# Patient Record
Sex: MALE | Race: WHITE | Employment: UNEMPLOYED | URBAN - METROPOLITAN AREA
[De-identification: names, ages, dates, MRNs, and addresses within clinical notes are randomized per-mention and may not be internally consistent; named-entity substitution may affect disease eponyms.]

---

## 2022-06-04 ENCOUNTER — TELEPHONE ENCOUNTER (OUTPATIENT)
Dept: URBAN - METROPOLITAN AREA CLINIC 68 | Facility: CLINIC | Age: 37
End: 2022-06-04

## 2022-06-05 ENCOUNTER — TELEPHONE ENCOUNTER (OUTPATIENT)
Dept: URBAN - METROPOLITAN AREA CLINIC 68 | Facility: CLINIC | Age: 37
End: 2022-06-05

## 2022-06-05 RX ORDER — PANTOPRAZOLE SODIUM 40 MG/1
TABLET, DELAYED RELEASE ORAL EVERY 12 HOURS
Qty: 120 | Refills: 0 | Status: ACTIVE | COMMUNITY
Start: 2019-09-18

## 2022-06-25 ENCOUNTER — TELEPHONE ENCOUNTER (OUTPATIENT)
Age: 37
End: 2022-06-25

## 2022-06-26 ENCOUNTER — TELEPHONE ENCOUNTER (OUTPATIENT)
Age: 37
End: 2022-06-26

## 2022-06-26 RX ORDER — PANTOPRAZOLE 40 MG/1
TABLET, DELAYED RELEASE ORAL EVERY 12 HOURS
Qty: 120 | Refills: 0 | Status: ACTIVE | COMMUNITY
Start: 2019-09-18

## 2023-10-17 ENCOUNTER — TELEPHONE ENCOUNTER (OUTPATIENT)
Dept: URBAN - METROPOLITAN AREA CLINIC 68 | Facility: CLINIC | Age: 38
End: 2023-10-17

## 2023-10-18 ENCOUNTER — OFFICE VISIT (OUTPATIENT)
Dept: URBAN - METROPOLITAN AREA CLINIC 68 | Facility: CLINIC | Age: 38
End: 2023-10-18
Payer: OTHER GOVERNMENT

## 2023-10-18 ENCOUNTER — LAB OUTSIDE AN ENCOUNTER (OUTPATIENT)
Dept: URBAN - METROPOLITAN AREA CLINIC 68 | Facility: CLINIC | Age: 38
End: 2023-10-18

## 2023-10-18 VITALS — BODY MASS INDEX: 23.38 KG/M2 | WEIGHT: 188 LBS | HEIGHT: 75 IN

## 2023-10-18 DIAGNOSIS — R13.10 DYSPHAGIA, UNSPECIFIED TYPE: ICD-10-CM

## 2023-10-18 DIAGNOSIS — K22.2 ESOPHAGEAL STRICTURE: ICD-10-CM

## 2023-10-18 DIAGNOSIS — R19.7 DIARRHEA, UNSPECIFIED TYPE: ICD-10-CM

## 2023-10-18 DIAGNOSIS — K20.0 EOSINOPHILIC ESOPHAGITIS: ICD-10-CM

## 2023-10-18 PROCEDURE — 99204 OFFICE O/P NEW MOD 45 MIN: CPT | Performed by: INTERNAL MEDICINE

## 2023-10-18 RX ORDER — PANTOPRAZOLE 40 MG/1
TABLET, DELAYED RELEASE ORAL EVERY 12 HOURS
Qty: 120 | Refills: 0 | Status: ON HOLD | COMMUNITY
Start: 2019-09-18

## 2023-10-18 NOTE — HPI-TODAY'S VISIT:
Patient evaluated due to dysphagia and Eosinophilic esophagitis.  Referred that is currently  worsening dysphagia. Referred last EGD with dilation aproximatelly 1-2 yrs.  Referred having intermittent episodes of diarrhea for years.  Denies nausea, vomits, odynophagia, heartburn, abdominal pain, constipation GI bleeding or weight loss

## 2023-11-06 ENCOUNTER — OUT OF OFFICE VISIT (OUTPATIENT)
Dept: URBAN - METROPOLITAN AREA SURGERY CENTER 12 | Facility: SURGERY CENTER | Age: 38
End: 2023-11-06
Payer: OTHER GOVERNMENT

## 2023-11-06 ENCOUNTER — CLAIMS CREATED FROM THE CLAIM WINDOW (OUTPATIENT)
Dept: URBAN - METROPOLITAN AREA CLINIC 4 | Facility: CLINIC | Age: 38
End: 2023-11-06
Payer: OTHER GOVERNMENT

## 2023-11-06 DIAGNOSIS — K29.70 GASTRITIS: ICD-10-CM

## 2023-11-06 DIAGNOSIS — K29.70 GASTRITIS WITHOUT BLEEDING, UNSPECIFIED CHRONICITY, UNSPECIFIED GASTRITIS TYPE: ICD-10-CM

## 2023-11-06 DIAGNOSIS — K29.70 GASTRITIS, UNSPECIFIED, WITHOUT BLEEDING: ICD-10-CM

## 2023-11-06 DIAGNOSIS — K22.2 ESOPHAGEAL STENOSIS: ICD-10-CM

## 2023-11-06 DIAGNOSIS — K22.2 ESOPHAGEAL OBSTRUCTION: ICD-10-CM

## 2023-11-06 DIAGNOSIS — R13.10 DYSPHAGIA: ICD-10-CM

## 2023-11-06 DIAGNOSIS — K20.90 ESOPHAGITIS, UNSPECIFIED WITHOUT BLEEDING: ICD-10-CM

## 2023-11-06 PROCEDURE — 43239 EGD BIOPSY SINGLE/MULTIPLE: CPT | Performed by: INTERNAL MEDICINE

## 2023-11-06 PROCEDURE — 43248 EGD GUIDE WIRE INSERTION: CPT | Performed by: INTERNAL MEDICINE

## 2023-11-06 PROCEDURE — 88312 SPECIAL STAINS GROUP 1: CPT | Performed by: PATHOLOGY

## 2023-11-06 PROCEDURE — 88305 TISSUE EXAM BY PATHOLOGIST: CPT | Performed by: PATHOLOGY

## 2023-11-06 PROCEDURE — 00731 ANES UPR GI NDSC PX NOS: CPT | Performed by: NURSE ANESTHETIST, CERTIFIED REGISTERED

## 2023-11-06 RX ORDER — PANTOPRAZOLE 40 MG/1
TABLET, DELAYED RELEASE ORAL EVERY 12 HOURS
Qty: 120 | Refills: 0 | Status: ON HOLD | COMMUNITY
Start: 2019-09-18

## 2023-11-15 ENCOUNTER — OFFICE VISIT (OUTPATIENT)
Dept: URBAN - METROPOLITAN AREA CLINIC 67 | Facility: CLINIC | Age: 38
End: 2023-11-15

## 2023-11-22 ENCOUNTER — OFFICE VISIT (OUTPATIENT)
Dept: URBAN - METROPOLITAN AREA CLINIC 68 | Facility: CLINIC | Age: 38
End: 2023-11-22
Payer: OTHER GOVERNMENT

## 2023-11-22 VITALS
SYSTOLIC BLOOD PRESSURE: 120 MMHG | HEIGHT: 75 IN | DIASTOLIC BLOOD PRESSURE: 82 MMHG | RESPIRATION RATE: 90 BRPM | BODY MASS INDEX: 23.38 KG/M2 | WEIGHT: 188 LBS | OXYGEN SATURATION: 99 %

## 2023-11-22 DIAGNOSIS — R13.10 DYSPHAGIA, UNSPECIFIED TYPE: ICD-10-CM

## 2023-11-22 DIAGNOSIS — R19.7 DIARRHEA, UNSPECIFIED TYPE: ICD-10-CM

## 2023-11-22 DIAGNOSIS — K20.0 EOSINOPHILIC ESOPHAGITIS: ICD-10-CM

## 2023-11-22 PROCEDURE — 99214 OFFICE O/P EST MOD 30 MIN: CPT

## 2023-11-22 RX ORDER — PANTOPRAZOLE 40 MG/1
TABLET, DELAYED RELEASE ORAL EVERY 12 HOURS
Qty: 120 | Refills: 0 | Status: ON HOLD | COMMUNITY
Start: 2019-09-18

## 2023-11-22 NOTE — HPI-TODAY'S VISIT:
Patient presents for follow-up s/p EGD due to dysphagia and Eosinophilic esophagitis.  Referred dysphagia has improved after dilitation. Referred having intermittent episodes of diarrhea for years. He does have upcoming HBT to r/o SIBO. Denies nausea, vomits, odynophagia, heartburn, abdominal pain, constipation GI bleeding or weight loss.

## 2023-12-21 ENCOUNTER — OFFICE VISIT (OUTPATIENT)
Dept: URBAN - METROPOLITAN AREA CLINIC 67 | Facility: CLINIC | Age: 38
End: 2023-12-21
Payer: OTHER GOVERNMENT

## 2023-12-21 DIAGNOSIS — K63.8219 SMALL INTESTINAL BACTERIAL OVERGROWTH (SIBO): ICD-10-CM

## 2023-12-21 PROCEDURE — 91065 BREATH HYDROGEN/METHANE TEST: CPT | Performed by: INTERNAL MEDICINE

## 2024-01-08 ENCOUNTER — LAB OUTSIDE AN ENCOUNTER (OUTPATIENT)
Dept: URBAN - METROPOLITAN AREA CLINIC 68 | Facility: CLINIC | Age: 39
End: 2024-01-08

## 2024-01-08 ENCOUNTER — OFFICE VISIT (OUTPATIENT)
Dept: URBAN - METROPOLITAN AREA CLINIC 68 | Facility: CLINIC | Age: 39
End: 2024-01-08
Payer: OTHER GOVERNMENT

## 2024-01-08 VITALS — HEIGHT: 75 IN | OXYGEN SATURATION: 99 % | BODY MASS INDEX: 23.38 KG/M2 | WEIGHT: 188 LBS | HEART RATE: 77 BPM

## 2024-01-08 DIAGNOSIS — R12 HEARTBURN: ICD-10-CM

## 2024-01-08 DIAGNOSIS — R13.10 DYSPHAGIA, UNSPECIFIED TYPE: ICD-10-CM

## 2024-01-08 DIAGNOSIS — K63.8219 SMALL INTESTINAL BACTERIAL OVERGROWTH (SIBO): ICD-10-CM

## 2024-01-08 DIAGNOSIS — K58.0 IRRITABLE BOWEL SYNDROME WITH DIARRHEA: ICD-10-CM

## 2024-01-08 PROBLEM — 197125005: Status: ACTIVE | Noted: 2024-01-04

## 2024-01-08 PROCEDURE — 99214 OFFICE O/P EST MOD 30 MIN: CPT

## 2024-01-08 RX ORDER — FAMOTIDINE 40 MG/1
1 TABLET AT BEDTIME TABLET, FILM COATED ORAL ONCE A DAY
Qty: 90 TABLET | Refills: 0 | OUTPATIENT
Start: 2024-01-08

## 2024-01-08 RX ORDER — PANTOPRAZOLE 40 MG/1
TABLET, DELAYED RELEASE ORAL EVERY 12 HOURS
Qty: 120 | Refills: 0 | Status: ON HOLD | COMMUNITY
Start: 2019-09-18

## 2024-01-08 RX ORDER — PANTOPRAZOLE SODIUM 40 MG/1
1 TABLET TABLET, DELAYED RELEASE ORAL ONCE A DAY
Qty: 90 TABLET | Refills: 3 | OUTPATIENT
Start: 2024-01-08

## 2024-01-08 RX ORDER — RIFAXIMIN 550 MG/1
1 TABLET TABLET ORAL THREE TIMES A DAY
Qty: 42 TABLET | Refills: 0 | OUTPATIENT
Start: 2024-01-04 | End: 2024-01-18

## 2024-01-08 NOTE — HPI-TODAY'S VISIT:
Patient presents for HBT results consistent with SIBO. Refers continues with soft stools daily. Refers persistent heartburn daily. Uses Tums without relief.  Refers has not started PPI. Refers difficulty swallowing pills and some solids which has progressively worsened since his last EGD/dilation (11/6/23). Denies nausea, vomiting, regurgitation, odynophagia, abdominal pain, constipation GI bleeding or weight loss.

## 2024-01-08 NOTE — PHYSICAL EXAM HENT:
Head, normocephalic, atraumatic, Face, Face within normal limits, Ears, External ears within normal limits, Nose/Nasopharynx, External nose normal appearance, nares patent, no nasal discharge,  Lips, Appearance normal

## 2024-01-11 ENCOUNTER — CLAIMS CREATED FROM THE CLAIM WINDOW (OUTPATIENT)
Dept: URBAN - METROPOLITAN AREA SURGERY CENTER 12 | Facility: SURGERY CENTER | Age: 39
End: 2024-01-11
Payer: OTHER GOVERNMENT

## 2024-01-11 DIAGNOSIS — K29.70 GASTRITIS WITHOUT BLEEDING: ICD-10-CM

## 2024-01-11 DIAGNOSIS — K20.90 ESOPHAGITIS: ICD-10-CM

## 2024-01-11 DIAGNOSIS — K22.2 ESOPHAGEAL OBSTRUCTION: ICD-10-CM

## 2024-01-11 DIAGNOSIS — K22.2 OBSTRUCTION OF ESOPHAGUS: ICD-10-CM

## 2024-01-11 PROCEDURE — 43248 EGD GUIDE WIRE INSERTION: CPT | Performed by: INTERNAL MEDICINE

## 2024-01-11 PROCEDURE — 00731 ANES UPR GI NDSC PX NOS: CPT | Performed by: NURSE ANESTHETIST, CERTIFIED REGISTERED

## 2024-01-11 RX ORDER — FAMOTIDINE 40 MG/1
1 TABLET AT BEDTIME TABLET, FILM COATED ORAL ONCE A DAY
Qty: 90 TABLET | Refills: 0 | Status: ACTIVE | COMMUNITY
Start: 2024-01-08

## 2024-01-11 RX ORDER — PANTOPRAZOLE SODIUM 40 MG/1
1 TABLET TABLET, DELAYED RELEASE ORAL ONCE A DAY
Qty: 90 TABLET | Refills: 3 | Status: ACTIVE | COMMUNITY
Start: 2024-01-08

## 2024-01-11 RX ORDER — PANTOPRAZOLE 40 MG/1
TABLET, DELAYED RELEASE ORAL EVERY 12 HOURS
Qty: 120 | Refills: 0 | Status: ON HOLD | COMMUNITY
Start: 2019-09-18

## 2024-01-11 RX ORDER — RIFAXIMIN 550 MG/1
1 TABLET TABLET ORAL THREE TIMES A DAY
Qty: 42 TABLET | Refills: 0 | Status: ACTIVE | COMMUNITY
Start: 2024-01-04 | End: 2024-01-18

## 2024-01-25 ENCOUNTER — OFFICE VISIT (OUTPATIENT)
Dept: URBAN - METROPOLITAN AREA CLINIC 68 | Facility: CLINIC | Age: 39
End: 2024-01-25

## 2024-01-25 RX ORDER — PANTOPRAZOLE 40 MG/1
TABLET, DELAYED RELEASE ORAL EVERY 12 HOURS
Qty: 120 | Refills: 0 | Status: ON HOLD | COMMUNITY
Start: 2019-09-18

## 2024-01-25 RX ORDER — PANTOPRAZOLE SODIUM 40 MG/1
1 TABLET TABLET, DELAYED RELEASE ORAL ONCE A DAY
Qty: 90 TABLET | Refills: 3 | Status: ACTIVE | COMMUNITY
Start: 2024-01-08

## 2024-01-25 RX ORDER — FAMOTIDINE 40 MG/1
1 TABLET AT BEDTIME TABLET, FILM COATED ORAL ONCE A DAY
Qty: 90 TABLET | Refills: 0 | Status: ACTIVE | COMMUNITY
Start: 2024-01-08

## 2024-01-25 NOTE — HPI-TODAY'S VISIT:
Patient evaluated due to dysphagia/EoE. Had recent EGD with dilation with savary bar 15mm.  Today referred that Denies nausea, vomits,odynophagia, heartburn, abdominal pain, diarrhea, constipation GI bleeding or weight loss

## 2024-01-29 ENCOUNTER — OFFICE VISIT (OUTPATIENT)
Dept: URBAN - METROPOLITAN AREA CLINIC 68 | Facility: CLINIC | Age: 39
End: 2024-01-29

## 2024-01-29 RX ORDER — PANTOPRAZOLE SODIUM 40 MG/1
1 TABLET TABLET, DELAYED RELEASE ORAL ONCE A DAY
Qty: 90 TABLET | Refills: 3 | Status: ACTIVE | COMMUNITY
Start: 2024-01-08

## 2024-01-29 RX ORDER — PANTOPRAZOLE 40 MG/1
TABLET, DELAYED RELEASE ORAL EVERY 12 HOURS
Qty: 120 | Refills: 0 | Status: ON HOLD | COMMUNITY
Start: 2019-09-18

## 2024-01-29 RX ORDER — FAMOTIDINE 40 MG/1
1 TABLET AT BEDTIME TABLET, FILM COATED ORAL ONCE A DAY
Qty: 90 TABLET | Refills: 0 | Status: ACTIVE | COMMUNITY
Start: 2024-01-08

## 2024-01-29 NOTE — PHYSICAL EXAM NEUROLOGIC:
oriented to person, place and time , normal sensation , short and long term memory intact conducted a detailed discussion... I had a detailed discussion with the patient and/or guardian regarding the historical points, exam findings, and any diagnostic results supporting the discharge/admit diagnosis.

## 2024-01-29 NOTE — HPI-TODAY'S VISIT:
Patient evaluated due to dysphagia/EoE. Had recent EGD with savary 15mm dilation.  TOday referred that.  Denies nausea, vomits,  odynophagia, heartburn, abdominal pain, diarrhea, constipation GI bleeding or weight loss

## 2024-02-07 ENCOUNTER — OV EP (OUTPATIENT)
Dept: URBAN - METROPOLITAN AREA CLINIC 68 | Facility: CLINIC | Age: 39
End: 2024-02-07
Payer: OTHER GOVERNMENT

## 2024-02-07 ENCOUNTER — LAB (OUTPATIENT)
Dept: URBAN - METROPOLITAN AREA CLINIC 68 | Facility: CLINIC | Age: 39
End: 2024-02-07

## 2024-02-07 VITALS
WEIGHT: 188 LBS | SYSTOLIC BLOOD PRESSURE: 122 MMHG | DIASTOLIC BLOOD PRESSURE: 80 MMHG | BODY MASS INDEX: 23.38 KG/M2 | HEIGHT: 75 IN

## 2024-02-07 DIAGNOSIS — R13.10 DYSPHAGIA, UNSPECIFIED TYPE: ICD-10-CM

## 2024-02-07 DIAGNOSIS — R12 HEARTBURN: ICD-10-CM

## 2024-02-07 DIAGNOSIS — K20.0 EOSINOPHILIC ESOPHAGITIS: ICD-10-CM

## 2024-02-07 DIAGNOSIS — R19.7 DIARRHEA, UNSPECIFIED TYPE: ICD-10-CM

## 2024-02-07 PROBLEM — 62315008: Status: ACTIVE | Noted: 2023-10-18

## 2024-02-07 PROBLEM — 16331000: Status: ACTIVE | Noted: 2024-01-08

## 2024-02-07 PROCEDURE — 99214 OFFICE O/P EST MOD 30 MIN: CPT

## 2024-02-07 RX ORDER — SODIUM PICOSULFATE, MAGNESIUM OXIDE, AND ANHYDROUS CITRIC ACID 12; 3.5; 1 G/175ML; G/175ML; MG/175ML
175 ML THE FIRST DOSE THE EVENING BEFORE AND SECOND DOSE THE MORNING OF COLONOSCOPY LIQUID ORAL ONCE A DAY
Qty: 350 ML | Refills: 0 | OUTPATIENT
Start: 2024-02-07 | End: 2024-02-09

## 2024-02-07 RX ORDER — FAMOTIDINE 40 MG/1
1 TABLET AT BEDTIME TABLET, FILM COATED ORAL ONCE A DAY
Qty: 90 TABLET | Refills: 0 | Status: ACTIVE | COMMUNITY
Start: 2024-01-08

## 2024-02-07 RX ORDER — PANTOPRAZOLE SODIUM 40 MG/1
1 TABLET TABLET, DELAYED RELEASE ORAL ONCE A DAY
Qty: 90 TABLET | Refills: 3 | Status: ACTIVE | COMMUNITY
Start: 2024-01-08

## 2024-02-07 RX ORDER — PANTOPRAZOLE 40 MG/1
TABLET, DELAYED RELEASE ORAL EVERY 12 HOURS
Qty: 120 | Refills: 0 | Status: ON HOLD | COMMUNITY
Start: 2019-09-18

## 2024-02-07 NOTE — HPI-TODAY'S VISIT:
Patient presents for follow-up s/p EGD. Refers moderte relief of symptoms however continues with some difficulties swallowing. Refers he feels as though there is a "shelf" inside the upper region of his throat. Refers continues with daily diarrhea 3x per day which started several years ago. Denies nocturnal bm's. He did have HBT consistent with SIBO and completed Xifaxan tx with no relief. Refers continues on Pantoprazole 40mg/d with relief of heartburn. Patient denies nausea/vomiting, abdominal pain, melena, rectal bleeding, constipation, weight loss, fever.

## 2024-02-17 LAB
CALPROTECTIN, FECAL: 41
CLOSTRIDIUM DIFFICILE: (no result)
LACTOFERRIN, FECAL, QUANT.: <6.25
OVA AND PARASITES, CONC AND PERM SMEAR: (no result)
PANCREATIC ELASTASE, FECAL: >500

## 2024-03-07 ENCOUNTER — LAB (OUTPATIENT)
Dept: URBAN - METROPOLITAN AREA CLINIC 4 | Facility: CLINIC | Age: 39
End: 2024-03-07
Payer: OTHER GOVERNMENT

## 2024-03-07 ENCOUNTER — COLON (OUTPATIENT)
Dept: URBAN - METROPOLITAN AREA SURGERY CENTER 12 | Facility: SURGERY CENTER | Age: 39
End: 2024-03-07
Payer: OTHER GOVERNMENT

## 2024-03-07 DIAGNOSIS — R19.7 DIARRHEA, UNSPECIFIED TYPE: ICD-10-CM

## 2024-03-07 DIAGNOSIS — K64.8 OTHER HEMORRHOIDS: ICD-10-CM

## 2024-03-07 DIAGNOSIS — K63.89 OTHER SPECIFIED DISEASES OF INTESTINE: ICD-10-CM

## 2024-03-07 PROCEDURE — 88342 IMHCHEM/IMCYTCHM 1ST ANTB: CPT | Performed by: PATHOLOGY

## 2024-03-07 PROCEDURE — 88313 SPECIAL STAINS GROUP 2: CPT | Performed by: PATHOLOGY

## 2024-03-07 PROCEDURE — 45380 COLONOSCOPY AND BIOPSY: CPT | Performed by: INTERNAL MEDICINE

## 2024-03-07 PROCEDURE — 88305 TISSUE EXAM BY PATHOLOGIST: CPT | Performed by: PATHOLOGY

## 2024-03-07 RX ORDER — FAMOTIDINE 40 MG/1
1 TABLET AT BEDTIME TABLET, FILM COATED ORAL ONCE A DAY
Qty: 90 TABLET | Refills: 0 | Status: ACTIVE | COMMUNITY
Start: 2024-01-08

## 2024-03-07 RX ORDER — PANTOPRAZOLE SODIUM 40 MG/1
1 TABLET TABLET, DELAYED RELEASE ORAL ONCE A DAY
Qty: 90 TABLET | Refills: 3 | Status: ACTIVE | COMMUNITY
Start: 2024-01-08

## 2024-03-07 RX ORDER — PANTOPRAZOLE 40 MG/1
TABLET, DELAYED RELEASE ORAL EVERY 12 HOURS
Qty: 120 | Refills: 0 | Status: ON HOLD | COMMUNITY
Start: 2019-09-18

## 2024-03-21 ENCOUNTER — OV EP (OUTPATIENT)
Dept: URBAN - METROPOLITAN AREA CLINIC 68 | Facility: CLINIC | Age: 39
End: 2024-03-21

## 2024-03-21 VITALS
TEMPERATURE: 97.7 F | BODY MASS INDEX: 23 KG/M2 | OXYGEN SATURATION: 99 % | DIASTOLIC BLOOD PRESSURE: 78 MMHG | SYSTOLIC BLOOD PRESSURE: 122 MMHG | HEART RATE: 78 BPM | HEIGHT: 75 IN | WEIGHT: 185 LBS

## 2024-03-21 DIAGNOSIS — K63.8219 SMALL INTESTINAL BACTERIAL OVERGROWTH (SIBO): ICD-10-CM

## 2024-03-21 DIAGNOSIS — K20.0 EOSINOPHILIC ESOPHAGITIS: ICD-10-CM

## 2024-03-21 DIAGNOSIS — R19.7 DIARRHEA, UNSPECIFIED TYPE: ICD-10-CM

## 2024-03-21 DIAGNOSIS — R13.10 DYSPHAGIA, UNSPECIFIED TYPE: ICD-10-CM

## 2024-03-21 DIAGNOSIS — R12 HEARTBURN: ICD-10-CM

## 2024-03-21 RX ORDER — PANTOPRAZOLE SODIUM 40 MG/1
1 TABLET TABLET, DELAYED RELEASE ORAL ONCE A DAY
Qty: 90 TABLET | Refills: 3 | Status: ACTIVE | COMMUNITY
Start: 2024-01-08

## 2024-03-21 RX ORDER — FAMOTIDINE 40 MG/1
1 TABLET AT BEDTIME TABLET, FILM COATED ORAL ONCE A DAY
Qty: 90 TABLET | Refills: 0 | Status: ACTIVE | COMMUNITY
Start: 2024-01-08

## 2024-03-21 RX ORDER — BUDESONIDE 2 MG/10ML
10 ML SUSPENSION ORAL TWICE A DAY
Qty: 600 ML | Refills: 2 | OUTPATIENT
Start: 2024-03-21 | End: 2024-06-19

## 2024-03-21 NOTE — HPI-TODAY'S VISIT:
Patient evaluated due to EoE and chronic diarrhea. Refers continues on Pantoprazole 40mg/d with relief of heartburn however continues with difficulties swallowing with globus sensation. Refers continues with daily diarrhea 3x per day which started several years ago. He did have HBT consistent with SIBO and completed Xifaxan tx with no relief. Patient denies nausea/vomiting, abdominal pain, melena, rectal bleeding, constipation, weight loss, fever.

## 2024-05-03 ENCOUNTER — TELEPHONE ENCOUNTER (OUTPATIENT)
Dept: URBAN - METROPOLITAN AREA CLINIC 68 | Facility: CLINIC | Age: 39
End: 2024-05-03

## 2024-05-10 ENCOUNTER — OFFICE VISIT (OUTPATIENT)
Dept: URBAN - METROPOLITAN AREA CLINIC 68 | Facility: CLINIC | Age: 39
End: 2024-05-10
Payer: OTHER GOVERNMENT

## 2024-05-10 VITALS
SYSTOLIC BLOOD PRESSURE: 132 MMHG | WEIGHT: 180 LBS | BODY MASS INDEX: 22.38 KG/M2 | HEIGHT: 75 IN | DIASTOLIC BLOOD PRESSURE: 78 MMHG

## 2024-05-10 DIAGNOSIS — K63.8219 SMALL INTESTINAL BACTERIAL OVERGROWTH (SIBO): ICD-10-CM

## 2024-05-10 DIAGNOSIS — K20.0 EOSINOPHILIC ESOPHAGITIS: ICD-10-CM

## 2024-05-10 DIAGNOSIS — R12 HEARTBURN: ICD-10-CM

## 2024-05-10 PROCEDURE — 99213 OFFICE O/P EST LOW 20 MIN: CPT | Performed by: INTERNAL MEDICINE

## 2024-05-10 RX ORDER — PANTOPRAZOLE SODIUM 40 MG/1
1 TABLET TABLET, DELAYED RELEASE ORAL ONCE A DAY
Qty: 90 TABLET | Refills: 3 | Status: ACTIVE | COMMUNITY
Start: 2024-01-08

## 2024-05-10 RX ORDER — FAMOTIDINE 40 MG/1
1 TABLET AT BEDTIME TABLET, FILM COATED ORAL ONCE A DAY
Qty: 90 TABLET | Refills: 0 | Status: ACTIVE | COMMUNITY
Start: 2024-01-08

## 2024-05-10 RX ORDER — BUDESONIDE 2 MG/10ML
10 ML SUSPENSION ORAL TWICE A DAY
Qty: 600 ML | Refills: 2 | Status: ACTIVE | COMMUNITY
Start: 2024-03-21 | End: 2024-06-19

## 2024-05-29 ENCOUNTER — OFFICE VISIT (OUTPATIENT)
Dept: URBAN - METROPOLITAN AREA CLINIC 68 | Facility: CLINIC | Age: 39
End: 2024-05-29

## 2024-05-29 ENCOUNTER — DASHBOARD ENCOUNTERS (OUTPATIENT)
Age: 39
End: 2024-05-29

## 2024-05-29 RX ORDER — PANTOPRAZOLE SODIUM 40 MG/1
1 TABLET TABLET, DELAYED RELEASE ORAL ONCE A DAY
Qty: 90 TABLET | Refills: 3 | Status: ACTIVE | COMMUNITY
Start: 2024-01-08

## 2024-05-29 RX ORDER — BUDESONIDE 2 MG/10ML
10 ML SUSPENSION ORAL TWICE A DAY
Qty: 600 ML | Refills: 2 | Status: ACTIVE | COMMUNITY
Start: 2024-03-21 | End: 2024-06-19

## 2024-05-29 RX ORDER — FAMOTIDINE 40 MG/1
1 TABLET AT BEDTIME TABLET, FILM COATED ORAL ONCE A DAY
Qty: 90 TABLET | Refills: 0 | Status: ACTIVE | COMMUNITY
Start: 2024-01-08

## 2024-06-13 ENCOUNTER — OFFICE VISIT (OUTPATIENT)
Dept: URBAN - METROPOLITAN AREA CLINIC 68 | Facility: CLINIC | Age: 39
End: 2024-06-13
Payer: OTHER GOVERNMENT

## 2024-06-13 ENCOUNTER — LAB OUTSIDE AN ENCOUNTER (OUTPATIENT)
Dept: URBAN - METROPOLITAN AREA CLINIC 68 | Facility: CLINIC | Age: 39
End: 2024-06-13

## 2024-06-13 DIAGNOSIS — R12 HEARTBURN: ICD-10-CM

## 2024-06-13 DIAGNOSIS — K20.0 EOSINOPHILIC ESOPHAGITIS: ICD-10-CM

## 2024-06-13 PROCEDURE — 99214 OFFICE O/P EST MOD 30 MIN: CPT | Performed by: INTERNAL MEDICINE

## 2024-06-13 RX ORDER — PANTOPRAZOLE SODIUM 40 MG/1
1 TABLET TABLET, DELAYED RELEASE ORAL ONCE A DAY
Qty: 90 TABLET | Refills: 3 | Status: ACTIVE | COMMUNITY
Start: 2024-01-08

## 2024-06-13 RX ORDER — BUDESONIDE 2 MG/10ML
10 ML SUSPENSION ORAL TWICE A DAY
Qty: 600 ML | Refills: 2 | Status: ACTIVE | COMMUNITY
Start: 2024-03-21 | End: 2024-06-19

## 2024-06-13 RX ORDER — FAMOTIDINE 40 MG/1
1 TABLET AT BEDTIME TABLET, FILM COATED ORAL ONCE A DAY
Qty: 90 TABLET | Refills: 0 | Status: ACTIVE | COMMUNITY
Start: 2024-01-08

## 2024-06-13 NOTE — HPI-TODAY'S VISIT:
Patient evaluated due to EoE. On his last evaluation Eohilia samples were provided.  Referred only used the Eohilia for 2 days. Referrd having persistent dysphagia with solids.  Today referred that had previous allergist evaluations.  Denies nausea, vomits, odynophagia, heartburn, abdominal pain, diarrhea, constipation GI bleeding or weight loss

## 2024-06-20 ENCOUNTER — CLAIMS CREATED FROM THE CLAIM WINDOW (OUTPATIENT)
Dept: URBAN - METROPOLITAN AREA SURGERY CENTER 12 | Facility: SURGERY CENTER | Age: 39
End: 2024-06-20
Payer: OTHER GOVERNMENT

## 2024-06-20 DIAGNOSIS — K22.2 ESOPHAGEAL OBSTRUCTION: ICD-10-CM

## 2024-06-20 DIAGNOSIS — R13.19 DYSPHAGIA: ICD-10-CM

## 2024-06-20 PROCEDURE — 43248 EGD GUIDE WIRE INSERTION: CPT | Performed by: INTERNAL MEDICINE

## 2024-06-20 PROCEDURE — 00731 ANES UPR GI NDSC PX NOS: CPT | Performed by: NURSE ANESTHETIST, CERTIFIED REGISTERED

## 2024-06-20 RX ORDER — PANTOPRAZOLE SODIUM 40 MG/1
1 TABLET TABLET, DELAYED RELEASE ORAL ONCE A DAY
Qty: 90 TABLET | Refills: 3 | Status: ACTIVE | COMMUNITY
Start: 2024-01-08

## 2024-06-20 RX ORDER — FAMOTIDINE 40 MG/1
1 TABLET AT BEDTIME TABLET, FILM COATED ORAL ONCE A DAY
Qty: 90 TABLET | Refills: 0 | Status: ACTIVE | COMMUNITY
Start: 2024-01-08

## 2024-07-05 ENCOUNTER — OFFICE VISIT (OUTPATIENT)
Dept: URBAN - METROPOLITAN AREA CLINIC 68 | Facility: CLINIC | Age: 39
End: 2024-07-05
Payer: OTHER GOVERNMENT

## 2024-07-05 VITALS
HEIGHT: 75 IN | SYSTOLIC BLOOD PRESSURE: 126 MMHG | OXYGEN SATURATION: 98 % | TEMPERATURE: 97.8 F | HEART RATE: 76 BPM | DIASTOLIC BLOOD PRESSURE: 80 MMHG | WEIGHT: 180 LBS | BODY MASS INDEX: 22.38 KG/M2

## 2024-07-05 DIAGNOSIS — K22.2 ESOPHAGEAL STRICTURE: ICD-10-CM

## 2024-07-05 DIAGNOSIS — R12 HEARTBURN: ICD-10-CM

## 2024-07-05 DIAGNOSIS — K20.0 EOSINOPHILIC ESOPHAGITIS: ICD-10-CM

## 2024-07-05 DIAGNOSIS — K29.30 CHRONIC SUPERFICIAL GASTRITIS WITHOUT BLEEDING: ICD-10-CM

## 2024-07-05 PROCEDURE — 99214 OFFICE O/P EST MOD 30 MIN: CPT

## 2024-07-05 RX ORDER — PANTOPRAZOLE SODIUM 40 MG/1
1 TABLET TABLET, DELAYED RELEASE ORAL ONCE A DAY
Qty: 90 TABLET | Refills: 3 | Status: ACTIVE | COMMUNITY
Start: 2024-01-08

## 2024-07-05 RX ORDER — FAMOTIDINE 40 MG/1
1 TABLET AT BEDTIME TABLET, FILM COATED ORAL ONCE A DAY
Qty: 90 TABLET | Refills: 0 | Status: ACTIVE | COMMUNITY
Start: 2024-01-08

## 2024-07-05 NOTE — HPI-TODAY'S VISIT:
Patient with EoE  and chronic diarrhea presents for follow-up s/p EGD with esophageal dilation. Refers continues on Eohilia with relief of dysphagia. Refers taking Silver Fern probiotic with relief of diarrhea. Denies nausea, vomiting, dysphagia, odynophagia, heartburn, abdominal pain, diarrhea, constipation GI bleeding or weight loss.

## 2024-07-11 ENCOUNTER — OFFICE VISIT (OUTPATIENT)
Dept: URBAN - METROPOLITAN AREA CLINIC 68 | Facility: CLINIC | Age: 39
End: 2024-07-11

## 2024-09-27 ENCOUNTER — TELEPHONE ENCOUNTER (OUTPATIENT)
Dept: URBAN - METROPOLITAN AREA CLINIC 68 | Facility: CLINIC | Age: 39
End: 2024-09-27

## 2024-10-08 ENCOUNTER — OFFICE VISIT (OUTPATIENT)
Dept: URBAN - METROPOLITAN AREA TELEHEALTH 1 | Facility: TELEHEALTH | Age: 39
End: 2024-10-08

## 2024-10-08 RX ORDER — PANTOPRAZOLE SODIUM 40 MG/1
1 TABLET TABLET, DELAYED RELEASE ORAL ONCE A DAY
Qty: 90 TABLET | Refills: 3 | COMMUNITY
Start: 2024-01-08

## 2024-10-08 RX ORDER — FAMOTIDINE 40 MG/1
1 TABLET AT BEDTIME TABLET, FILM COATED ORAL ONCE A DAY
Qty: 90 TABLET | Refills: 0 | COMMUNITY
Start: 2024-01-08